# Patient Record
Sex: MALE | Race: WHITE | URBAN - METROPOLITAN AREA
[De-identification: names, ages, dates, MRNs, and addresses within clinical notes are randomized per-mention and may not be internally consistent; named-entity substitution may affect disease eponyms.]

---

## 2018-10-16 ENCOUNTER — OFFICE VISIT (OUTPATIENT)
Dept: FAMILY MEDICINE | Facility: CLINIC | Age: 58
End: 2018-10-16
Payer: COMMERCIAL

## 2018-10-16 VITALS
HEIGHT: 74 IN | WEIGHT: 222.4 LBS | RESPIRATION RATE: 16 BRPM | BODY MASS INDEX: 28.54 KG/M2 | HEART RATE: 68 BPM | OXYGEN SATURATION: 96 % | SYSTOLIC BLOOD PRESSURE: 110 MMHG | DIASTOLIC BLOOD PRESSURE: 70 MMHG | TEMPERATURE: 98.1 F

## 2018-10-16 DIAGNOSIS — L72.0 WEN: Primary | ICD-10-CM

## 2018-10-16 PROCEDURE — 11421 EXC H-F-NK-SP B9+MARG 0.6-1: CPT | Performed by: FAMILY MEDICINE

## 2018-10-16 NOTE — PROGRESS NOTES
Subjective:  Alonzo aHrding is a 58 year old male   Chief Complaint   Patient presents with     Derm Problem     ? cyst on the top of head ? to be removed.      Health Maintenance     pt. is due for colonoscopy      He has had several wens excised from his scalp in the past and now has another one that is fairly large and bothering him in the left frontal parietal area.      Encounter Diagnosis   Name Primary?     Beatrice Yes           Medical, surgical, social, and family histories, medications and allergies reviewed and updated.    Objective:  Exam:    GENERAL APPEARANCE ADULT: Alert, no acute distress  EYES: PERRL, EOM normal, conjunctiva and lids normal  SKIN: 1 cm firm subcutaneous mass in the scalp, left frontal parietal area      ASSESSMENT:  1. Beatrice        PLAN:  Orders Placed This Encounter     EXC BENIGN SKIN LESION SCLP/NCK/HNDS/FEET/GEN 0.6-1.0 CM       The skin was prepped with 1% lidocaine with epi.  Then an incision was made over the cyst which was expressed out in the surrounding capsule teased out intact.  Skin was closed with 2 interrupted sutures of 3-0 nylon.  Suture removal in 7 days.  Wound care discussed

## 2018-10-16 NOTE — MR AVS SNAPSHOT
"              After Visit Summary   10/16/2018    Alonzo Harding    MRN: [id number]           Patient Information     Date Of Birth          1960        Visit Information        Provider Department      10/16/2018 7:40 AM Post, ABRIL Adkins MD [facility]        Today's Diagnoses     Beatrice    -  1       Follow-ups after your visit        Your next 10 appointments already scheduled     Nov 13, 2018  8:00 AM CST   Return Sleep Patient with Virgilio Dean MD   [facility] ([facility])    [id number]  [facility] [address]   [phone]              Who to contact     If you have questions or need follow up information about today's clinic visit or your schedule please contact [facility]note 3] directly at [phone].  Normal or non-critical lab and imaging results will be communicated to you by MyChart, letter or phone within 4 business days after the clinic has received the results. If you do not hear from us within 7 days, please contact the clinic through MyChart or phone. If you have a critical or abnormal lab result, we will notify you by phone as soon as possible.  Submit refill requests through [employer] or call your pharmacy and they will forward the refill request to us. Please allow 3 business days for your refill to be completed.          Additional Information About Your Visit        Care EveryWhere ID     This is your Care EveryWhere ID. This could be used by other organizations to access your [facility] medical records  [id number]        Your Vitals Were     Pulse Temperature Respirations Height Pulse Oximetry BMI (Body Mass Index)    68 98.1  F (36.7  C) (Tympanic) 16 6' 2\" (1.88 m) 96% 28.55 kg/m2       Blood Pressure from Last 3 Encounters:   10/16/18 110/70   09/20/16 (!) 136/91   05/05/14 (!) 143/97    Weight from Last 3 Encounters:   10/16/18 222 lb 6.4 oz (100.9 kg)   09/20/16 219 lb (99.3 kg)   05/05/14 " 217 lb (98.4 kg)              We Performed the Following     EXC BENIGN SKIN LESION SCLP/NCK/HNDS/FEET/GEN 0.6-1.0 CM        Primary Care Provider Office Phone # Fax #    R Jed Webber [phone] [address] [facility] [address]        Equal Access to Services     [facility] HospitalAB[facility] : Hadii aad ku hadasho Soomaali, waaxda luqadaha, qaybta kaalmada adeegyada, waxay idiin hayaan adeeg kharash laTayleraan . So [facility] [phone].    ATENCIÓN: Si habla español, tiene a carballo disposición servicios gratuitos de asistencia lingüística. Llame al [phone].    We comply with applicable federal civil rights laws and [place] laws. We do not discriminate on the basis of race, color, national origin, age, disability, sex, sexual orientation, or gender identity.            Thank you!     Thank you for choosing [facility]  for your care. Our goal is always to provide you with excellent care. Hearing back from our patients is one way we can continue to improve our services. Please take a few minutes to complete the written survey that you may receive in the mail after your visit with us. Thank you!             Your Updated Medication List - Protect others around you: Learn how to safely use, store and throw away your medicines at www.disposemymeds.org.          This list is accurate as of 10/16/18  8:33 AM.  Always use your most recent med list.                   Brand Name Dispense Instructions for use Diagnosis    order for DME     1 each    Auto-titrate CPAP: 8 - 12 cm H2O  Continuous  Lifetime need and heated humidity.    JAMIN (obstructive sleep apnea)

## 2018-11-13 ENCOUNTER — OFFICE VISIT (OUTPATIENT)
Dept: SLEEP MEDICINE | Facility: CLINIC | Age: 58
End: 2018-11-13
Payer: COMMERCIAL

## 2018-11-13 VITALS
DIASTOLIC BLOOD PRESSURE: 87 MMHG | HEART RATE: 68 BPM | HEIGHT: 74 IN | BODY MASS INDEX: 28.62 KG/M2 | WEIGHT: 223 LBS | SYSTOLIC BLOOD PRESSURE: 146 MMHG | OXYGEN SATURATION: 97 %

## 2018-11-13 DIAGNOSIS — G47.33 OSA (OBSTRUCTIVE SLEEP APNEA): Primary | ICD-10-CM

## 2018-11-13 PROCEDURE — 99214 OFFICE O/P EST MOD 30 MIN: CPT | Performed by: FAMILY MEDICINE

## 2018-11-13 NOTE — MR AVS SNAPSHOT
After Visit Summary   11/13/2018    Alonzo Harding    MRN: [id number]           Patient Information     Date Of Birth          1960        Visit Information        Provider Department      11/13/2018 8:00 AM Virgilio Dean MD [facility]        Today's Diagnoses     JAMIN (obstructive sleep apnea)    -  1      Care Instructions      Your BMI is Body mass index is 28.62 kg/(m^2).  Weight management is a personal decision.  If you are interested in exploring weight loss strategies, the following discussion covers the approaches that may be successful. Body mass index (BMI) is one way to tell whether you are at a healthy weight, overweight, or obese. It measures your weight in relation to your height.  A BMI of 18.5 to 24.9 is in the healthy range. A person with a BMI of 25 to 29.9 is considered overweight, and someone with a BMI of 30 or greater is considered obese. More than two-thirds of American adults are considered overweight or obese.  Being overweight or obese increases the risk for further weight gain. Excess weight may lead to heart disease and diabetes.  Creating and following plans for healthy eating and physical activity may help you improve your health.  Weight control is part of healthy lifestyle and includes exercise, emotional health, and healthy eating habits. Careful eating habits lifelong are the mainstay of weight control. Though there are significant health benefits from weight loss, long-term weight loss with diet alone may be very difficult to achieve- studies show long-term success with dietary management in less than 10% of people. Attaining a healthy weight may be especially difficult to achieve in those with severe obesity. In some cases, medications, devices and surgical management might be considered.  What can you do?  If you are overweight or obese and are interested in methods for weight loss, you should discuss this with your provider.      Consider reducing daily calorie intake by 500 calories.     Keep a food journal.     Avoiding skipping meals, consider cutting portions instead.    Diet combined with exercise helps maintain muscle while optimizing fat loss. Strength training is particularly important for building and maintaining muscle mass. Exercise helps reduce stress, increase energy, and improves fitness. Increasing exercise without diet control, however, may not burn enough calories to loose weight.       Start walking three days a week 10-20 minutes at a time    Work towards walking thirty minutes five days a week     Eventually, increase the speed of your walking for 1-2 minutes at time    In addition, we recommend that you review healthy lifestyles and methods for weight loss available through the National Institutes of Health patient information sites:  http://win.niddk.nih.gov/publications/index.htm    And look into health and wellness programs that may be available through your health insurance provider, employer, local community center, or mohit club.    Weight management plan: Patient was referred to their PCP to discuss a diet and exercise plan.        Your Body mass index is 28.62 kg/(m^2).  Weight management is a personal decision.  If you are interested in exploring weight loss strategies, the following discussion covers the approaches that may be successful. Body mass index (BMI) is one way to tell whether you are at a healthy weight, overweight, or obese. It measures your weight in relation to your height.  A BMI of 18.5 to 24.9 is in the healthy range. A person with a BMI of 25 to 29.9 is considered overweight, and someone with a BMI of 30 or greater is considered obese. More than two-thirds of American adults are considered overweight or obese.  Being overweight or obese increases the risk for further weight gain. Excess weight may lead to heart disease and diabetes.  Creating and following plans for healthy eating and  physical activity may help you improve your health.  Weight control is part of healthy lifestyle and includes exercise, emotional health, and healthy eating habits. Careful eating habits lifelong are the mainstay of weight control. Though there are significant health benefits from weight loss, long-term weight loss with diet alone may be very difficult to achieve- studies show long-term success with dietary management in less than 10% of people. Attaining a healthy weight may be especially difficult to achieve in those with severe obesity. In some cases, medications, devices and surgical management might be considered.  What can you do?  If you are overweight or obese and are interested in methods for weight loss, you should discuss this with your provider.     Consider reducing daily calorie intake by 500 calories.     Keep a food journal.     Avoiding skipping meals, consider cutting portions instead.    Diet combined with exercise helps maintain muscle while optimizing fat loss. Strength training is particularly important for building and maintaining muscle mass. Exercise helps reduce stress, increase energy, and improves fitness. Increasing exercise without diet control, however, may not burn enough calories to loose weight.       Start walking three days a week 10-20 minutes at a time    Work towards walking thirty minutes five days a week     Eventually, increase the speed of your walking for 1-2 minutes at time    In addition, we recommend that you review healthy lifestyles and methods for weight loss available through the National Institutes of Health patient information sites:  http://win.niddk.nih.gov/publications/index.htm    And look into health and wellness programs that may be available through your health insurance provider, employer, local community center, or mohit club.    Weight management plan: Patient was referred to their PCP to discuss a diet and exercise plan.          Follow-ups after your  "visit        Follow-up notes from your care team     Return in 2 years (on 11/13/2020) for PAP follow up.      Who to contact     If you have questions or need follow up information about today's clinic visit or your schedule please contact [facility] directly at [phone].  Normal or non-critical lab and imaging results will be communicated to you by MyChart, letter or phone within 4 business days after the clinic has received the results. If you do not hear from us within 7 days, please contact the clinic through MyChart or phone. If you have a critical or abnormal lab result, we will notify you by phone as soon as possible.  Submit refill requests through [employer] or call your pharmacy and they will forward the refill request to us. Please allow 3 business days for your refill to be completed.          Additional Information About Your Visit        Care EveryWhere ID     This is your Care EveryWhere ID. This could be used by other organizations to access your [facility] medical records  [id number]        Your Vitals Were     Pulse Height Pulse Oximetry BMI (Body Mass Index)          68 1.88 m (6' 2.02\") 97% 28.62 kg/m2         Blood Pressure from Last 3 Encounters:   11/13/18 146/87   10/16/18 110/70   09/20/16 (!) 136/91    Weight from Last 3 Encounters:   11/13/18 101.2 kg (223 lb)   10/16/18 100.9 kg (222 lb 6.4 oz)   09/20/16 99.3 kg (219 lb)              We Performed the Following     Sleep Comprehensive DME        Primary Care Provider Office Phone # Fax #    R Jed Webber [phone] [address] [facility] [address]        Equal Access to Services     PEPE KOTHARI : Hadii wagner Naranjo, colleen gardiner, qaybta franco coley. So [facility] [phone].    ATENCIÓN: Si habla español, tiene a carballo disposición servicios gratuitos de asistencia lingüística. Llame al [phone].    We comply with applicable " federal civil rights laws and [place] laws. We do not discriminate on the basis of race, color, national origin, age, disability, sex, sexual orientation, or gender identity.            Thank you!     Thank you for choosing [facility]  for your care. Our goal is always to provide you with excellent care. Hearing back from our patients is one way we can continue to improve our services. Please take a few minutes to complete the written survey that you may receive in the mail after your visit with us. Thank you!             Your Updated Medication List - Protect others around you: Learn how to safely use, store and throw away your medicines at www.disposemymeds.org.          This list is accurate as of 11/13/18  8:32 AM.  Always use your most recent med list.                   Brand Name Dispense Instructions for use Diagnosis    order for DME     1 each    Auto-titrate CPAP: 8 - 12 cm H2O  Continuous  Lifetime need and heated humidity.    JAMIN (obstructive sleep apnea)

## 2018-11-13 NOTE — NURSING NOTE
"Chief Complaint   Patient presents with     CPAP Follow Up     Routine follow up for supply order       Initial /87  Pulse 68  Ht 1.88 m (6' 2.02\")  Wt 101.2 kg (223 lb)  SpO2 97%  BMI 28.62 kg/m2 Estimated body mass index is 28.62 kg/(m^2) as calculated from the following:    Height as of this encounter: 1.88 m (6' 2.02\").    Weight as of this encounter: 101.2 kg (223 lb).    Medication Reconciliation: complete        DME: fhm  "

## 2018-11-13 NOTE — PROGRESS NOTES
Obstructive Sleep Apnea - PAP Follow-Up Visit:    Chief Complaint   Patient presents with     CPAP Follow Up     Routine follow up for supply order       Alonzo Harding comes in today for follow-up of their severe obstructive sleep apnea, managed with CPAP.     Initial diagnosis of severe JAMIN on PSG at a [facility] lab on 11/25/2005 with AHI 87, yg desat 88% with emergence of central events during incomplete CPAP / BiPAP titration (CPAP to 12 and BiPAP to 10/6). Had repeat PSG at [facility] on 12/13/2005 with weight 234 lbs, AHI 54.9, yg desat 83%, CPAP titration again showing centrals and BiPAP titrated to 15/10 that appeared effective. At follow, he reports that on data download that centrals appeared to have resolved. In 2006, he lost insurance and lost his biPAP machine and a few months later was able to get his friend's REMSTAR CPAP machine and was set at 8 cm H2O and appeared effective. He met with Dr. Reid in 2009 and pressure empirically increased to 10 cm H2O.    He reports that the CPAP continues to work well and feels he is sleeping well. He is still working in selling nutritional supplements.  Denies any snoring, apnea, insomnia.  CPAP download from 8/15/2018 - 11/12/2018 on auto-titrate CPAP 8-12 cm H2O.  Average daily usage of 8 hours 18 minutes, used > = 4 hours on 100% of nights.  Pressure median 9.4 cm H2O, 95th%ile of 10.8 cm H2O.  AHI 3.5.    Problem List:  Patient Active Problem List    Diagnosis Date Noted     Obstructive sleep apnea (adult) (pediatric) 11/23/2015     Priority: Medium     CARDIOVASCULAR SCREENING; LDL GOAL LESS THAN 160 10/31/2010     Priority: Medium     Paroxysmal atrial fibrillation (H)      Priority: Medium     Dx 2002.        Obstructive sleep apnea      Priority: Medium     Initial diagnosis of severe JAMIN on PSG at a [facility] lab on 11/25/2005 with AHI 87, yg desat 88% with emergence of central events during incomplete CPAP / BiPAP titration (CPAP to 12 and  "BiPAP to 10/6).  Had repeat PSG at [facility] on 12/13/2005 with weight 234 lbs, AHI 54.9, yg desat 83%, CPAP titration again showing centrals and BiPAP titrated to 15/10 that appeared effective.  At follow up, he reports that on data download that centrals appeared to have resolved.            /87  Pulse 68  Ht 1.88 m (6' 2.02\")  Wt 101.2 kg (223 lb)  SpO2 97%  BMI 28.62 kg/m2    Impression/Plan:    1.) Severe JAMIN with record of emergent centrals during CPAP titration  - JAMIN appears well controlled on auto-titrate CPAP 8-12 cm H2O, no evidence of frequent central events per download  - Continue CPAP at current settings and follow up in 1-2 year(s) or sooner if questions / concerns.    Alonzo Harding will follow up in about 2 year(s).     Twenty-five minutes spent with patient, all of which were spent face-to-face counseling, consulting, coordinating plan of care.      Virgilio Dean MD, MD    CC:  ABRIL Webber  "

## 2018-11-13 NOTE — PATIENT INSTRUCTIONS

## 2018-11-20 ENCOUNTER — DOCUMENTATION ONLY (OUTPATIENT)
Dept: SLEEP MEDICINE | Facility: CLINIC | Age: 58
End: 2018-11-20
Payer: COMMERCIAL

## 2018-11-20 NOTE — PROGRESS NOTES
SARIKA CAME TO [facility] FOR MASK FITTING, HE IS NOT ELIGIBLE UNTIL 12/27/18 FOR MASK /HEADGEAR.  HE REQUESTED TO TRY ON AIRFIT N20 MEDIUM. HE DIDN'T WANT TO TRY ON ANY OTHERS.   HE SCHEDULE APPT TO COME TO BACK TO [facility] ON 12/28/18 AT 9 AM TO  MASK/ HEADGEAR.  HE STATES HE HARDLY REPLACES SUPPLIES FOR CPAP MACHINE. HE STATES HE TAKES VERY GOOD CARE OF HIS SUPPIES AND DOESN'T FEEL THE NEED.   I ATTEMPTED TO GO OVER SUPPLY REPLACEMENT SCHEDULE AND HE WAS NOT INTERESTED.

## 2018-12-28 ENCOUNTER — DOCUMENTATION ONLY (OUTPATIENT)
Dept: SLEEP MEDICINE | Facility: CLINIC | Age: 58
End: 2018-12-28
Payer: COMMERCIAL

## 2018-12-28 NOTE — PROGRESS NOTES
PT CAME TODAY TO  MASK ALSO REQUESTED TUBING, I DID NOT HAVE ANY STANDARD TUBING IN STOCK WAS OK WITH HEATED TUBING. ALSO RECIEVED 2 FILTERS FOR HIS S9.